# Patient Record
(demographics unavailable — no encounter records)

---

## 2025-03-17 NOTE — HISTORY OF PRESENT ILLNESS
[FreeTextEntry1] : Hypertension\par  SVT\par  Obstructive sleep apnea\par  History of DVT\par  Negative iv adenosine thallium stress nuclear test 10/19

## 2025-03-17 NOTE — DISCUSSION/SUMMARY
[FreeTextEntry1] : Patient was instructed to target her T. Cholesterol to less than 200 mg/dl and LDL cholesterol to less than 100 mg/dl. She is at target goal. Exercise and weight loss was advised. EKG: NSR, rate of 60 bpm, First degree AV block, low voltage. EKG was interpreted and reviewed with the patient. Maintain cardiac medications.  Patient was advised to repeat a BMP, CBC , fasting lipid profile and hepatic panel in 6 months. A copy of her test results lab were provided to her. RV in 6 months.  [EKG obtained to assist in diagnosis and management of assessed problem(s)] : EKG obtained to assist in diagnosis and management of assessed problem(s)

## 2025-03-17 NOTE — ASSESSMENT
[FreeTextEntry1] : Hypertension  Hyperlipidemia  Negative iv adenosine thallium  History of DVT  [place].

## 2025-03-17 NOTE — PHYSICAL EXAM
[General Appearance - Well Developed] : well developed [General Appearance - Well Nourished] : well nourished [General Appearance - In No Acute Distress] : no acute distress [Normal Oropharynx] : normal oropharynx [Normal Jugular Venous V Waves Present] : normal jugular venous V waves present [Respiration, Rhythm And Depth] : normal respiratory rhythm and effort [Exaggerated Use Of Accessory Muscles For Inspiration] : no accessory muscle use [Heart Rate And Rhythm] : heart rate and rhythm were normal [Heart Sounds] : normal S1 and S2 [Arterial Pulses Normal] : the arterial pulses were normal [Edema] : no peripheral edema present [Abdomen Soft] : soft [Abdomen Tenderness] : non-tender [Abnormal Walk] : normal gait [Skin Turgor] : normal skin turgor [] : no rash [Oriented To Time, Place, And Person] : oriented to person, place, and time [Affect] : the affect was normal [Memory Recent] : recent memory was not impaired